# Patient Record
Sex: MALE | Race: WHITE | NOT HISPANIC OR LATINO | Employment: PART TIME
[De-identification: names, ages, dates, MRNs, and addresses within clinical notes are randomized per-mention and may not be internally consistent; named-entity substitution may affect disease eponyms.]

---
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
## 2021-05-27 ENCOUNTER — RECORDS - HEALTHEAST (OUTPATIENT)
Dept: ADMINISTRATIVE | Facility: CLINIC | Age: 21
End: 2021-05-27

## 2021-05-29 ENCOUNTER — RECORDS - HEALTHEAST (OUTPATIENT)
Dept: ADMINISTRATIVE | Facility: CLINIC | Age: 21
End: 2021-05-29

## 2021-05-31 VITALS — WEIGHT: 185.4 LBS

## 2021-06-01 VITALS — HEIGHT: 75 IN | BODY MASS INDEX: 23.54 KG/M2 | WEIGHT: 189.3 LBS

## 2021-06-01 VITALS — HEIGHT: 76 IN | WEIGHT: 189.1 LBS | BODY MASS INDEX: 23.03 KG/M2

## 2021-06-15 NOTE — PROGRESS NOTES
ASSESSMENT:  1. Wheezing  This is Jack's first episode of wheezing.  Two-view chest radiographs were obtained, which showed no acute infiltrate, effusion, pneumothorax, my reading.  Albuterol neb is given with significant improvement in his expiratory wheezes.  I suggested starting albuterol 2 puffs every 4 hours while he is awake until his cough is gone, and then wean off.  We discussed mycoplasma and walking pneumonia.  Return to clinic if his cough is not significantly improved in the next 24-48 hours, sooner with new or worsening symptoms.    - XR Chest 2 Views; Future  - albuterol nebulizer solution 3 mL (PROVENTIL); Take 3 mL by nebulization once.  - azithromycin (ZITHROMAX Z-SONU) 250 MG tablet; Take 500 mg (2 x 250 mg tablets) on day 1 followed by 250 mg (1 tablet) on days 2-5.  Dispense: 6 tablet; Refill: 0  - albuterol (VENTOLIN HFA) 90 mcg/actuation inhaler; Inhale 2 puffs every 4 (four) hours as needed for wheezing (or coughing).  Dispense: 16 g; Refill: 1  - Influenza, Seasonal,Quad Inj, 36+ MOS    2.  Furuncle/cellulitis  I suggested soaking once or twice daily in warm soapy water and recommended starting antibiotics, as above.  Return to clinic in several days, if there is no dramatic improvement; we discussed indications for incision and drainage should an abscess develop.    3. Acute suppur left otitis media w/spontan rupture of tympanic membrane  Reassurance is given regarding his very likely healing otitis media.  Prescription was given for azithromycin, as above.    PLAN:  Patient Instructions   Start Z-pack as prescribed, take 2 pills together today and one pill daily for the next 4 days. This will last in your body for 10 days.    Start albuterol inhaler every 4 hours while awake. Take two puffs, one minute apart. Continue this every 4 hours hours until the cough is gone, then wean off the medication (decreased to three times daily, twice daily, and then stop).       Orders Placed This Encounter    Procedures     XR Chest 2 Views     Standing Status:   Future     Number of Occurrences:   1     Standing Expiration Date:   1/17/2019     Order Specific Question:   Can the procedure be changed per Radiologist protocol?     Answer:   Yes     Influenza, Seasonal,Quad Inj, 36+ MOS     Order Specific Question:   Counseling provided to include answering patients questions and/or preemptively discussing the risks and benefits of all components.     Answer:   Yes     There are no discontinued medications.  Administrations This Visit     albuterol nebulizer solution 3 mL (PROVENTIL)     Admin Date Action Dose Route Administered By             01/17/2018 Given 3 mL Nebulization Adriana Fay CMA                        No Follow-up on file.    CHIEF COMPLAINT:  Chief Complaint   Patient presents with     Cough     started before christmas      Nasal Congestion     few days after cough      Ear Pain     started sunday and was leaking less pain now      Mass     back of right leg noticed on monday morning red and irritated and hard        HISTORY OF PRESENT ILLNESS:  Asif is a 17 y.o. male presenting to the clinic today with mom with concerns for cough. Symptoms started about a month ago with a tickle in his throat that is persistent and causing him to cough. His cough is worse in the day and does not keep him up at night. The cough is occasionally productive but he denies gagging or emesis. He developed nasal congestion a couple days after the cough. He started complaining of ear pain 4 days ago in the middle of the night and went back to sleep. When he woke up, his ear pain was improved but he noted some clear drainage on his pillowcase, no blood. He is a  and struggles to catch his breath with worse coughing after a game or practice. He has not history of asthma or albuterol use.    Mass: He has a mass on the back of his leg that he noticed 2 days ago. He describes the spot as irritated and hard. He tried  to pop the lesion without any production.    Health Maintenance: He will receive his seasonal influenza vaccine today.     REVIEW OF SYSTEMS:   He denies headaches, fevers, facial pressure, or sore throat. All other systems are negative.    PFSH:  He has no known exposure to pertussis. He has not yet received his seasonal influenza vaccine. His dad was recently diagnosed with viral bronchitis and prescribed codeine cough syrup.    Mother reports she had a lesion on her leg evaluated by dermatology, after my suggestion to that effect at Asif's visit last summer.  She reports the lesion was malignant melanoma.  She is currently in remission.    TOBACCO USE:  History   Smoking Status     Never Smoker   Smokeless Tobacco     Never Used       VITALS:  Vitals:    01/17/18 1350   BP: 130/64   Patient Site: Left Arm   Patient Position: Sitting   Cuff Size: Adult Regular   Pulse: 74   Temp: 98.3  F (36.8  C)   TempSrc: Oral   SpO2: 98%   Weight: 185 lb 6.4 oz (84.1 kg)     Wt Readings from Last 3 Encounters:   01/17/18 185 lb 6.4 oz (84.1 kg) (93 %, Z= 1.48)*   08/22/16 149 lb 11.2 oz (67.9 kg) (81 %, Z= 0.86)*   08/14/13 97 lb 8 oz (44.2 kg) (61 %, Z= 0.29)*     * Growth percentiles are based on CDC 2-20 Years data.     There is no height or weight on file to calculate BMI.    PHYSICAL EXAM:  Alert, no acute distress.   HEENT, Conjunctivae are clear. Right TM obscured by cerumen. Left TM with a small amount of purulent material in EAC, TM is mildly erythematous without visible pus or fluid. Nose is clear. Oropharynx is mildly erythematous posteriorly, tonsils 2+ bilaterally, without tonsillar exudate or lesions; small amount of post-nasal drainage is visualized.  Neck is supple without adenopathy. Trachea is midline.  Lungs, Scattered expiratory wheezes in the left base, less so in the left upper fields. Lungs have good air entry bilaterally. Expiratory phase seemed mildly prolonged.  Post Albuterol Neb Exam: One, faint,  end expiratory wheeze after the roby in the left mid-lung field.  Cardiac exam regular rate and rhythm, normal S1 and S2.  Abdomen is soft and nontender, bowel sounds are present, no hepatosplenomegaly.  Skin, clear without rash. Right, posterior lower extremity, overly his calf, there was a 1 cm diameter furuncle with approximately 1 cm diameter induration without tenderness or fluctuance There was a very small, central crust.   Neuro, moving all extremities equally.    ADDITIONAL HISTORY SUMMARIZED (2): None.  DECISION TO OBTAIN EXTRA INFORMATION (1): None.   RADIOLOGY TESTS (1): Chest XR ordered today.  LABS (1): None.  MEDICINE TESTS (1): Albuterol neb given in clinic.  INDEPENDENT REVIEW (2 each): Chest XR interpreted as above.     The visit lasted a total of 27 minutes face to face with the patient. Over 50% of the time was spent counseling and educating the patient about cough.    I, Shellie Busby, am scribing for and in the presence of, Dr. Brown.    I, Radames Bronw, personally performed the services described in this documentation, as scribed by Shellie Busby in my presence, and it is both accurate and complete.    MEDICATIONS:  Current Outpatient Prescriptions   Medication Sig Dispense Refill     butalbital-acetaminophen-caffeine (FIORICET, ESGIC) -40 mg per tablet TAKE 1-2 TABLETS BY MOUTH EVERY 6 HOURS AS NEEDED, MAX 4 TABLETS PER DAY 30 tablet 0     albuterol (VENTOLIN HFA) 90 mcg/actuation inhaler Inhale 2 puffs every 4 (four) hours as needed for wheezing (or coughing). 16 g 1     azithromycin (ZITHROMAX Z-SONU) 250 MG tablet Take 500 mg (2 x 250 mg tablets) on day 1 followed by 250 mg (1 tablet) on days 2-5. 6 tablet 0     No current facility-administered medications for this visit.        Total data points: 4

## 2021-06-19 NOTE — PROGRESS NOTES
Preoperative Exam    Scheduled Procedure: Knee Scope PLM vs  LMR  Surgery Date:  07/31/18  Surgery Location: [facility] [place]  fax [phone]  Surgeon:  Dr. Lange    Assessment/Plan:     1. Right knee meniscal tear    2. Pre-op evaluation        Surgical Procedure Risk: Low (reported cardiac risk generally < 1%)  Have you had prior anesthesia?: No  Have you or any family members had a previous anesthesia reaction: No  Do you or any family members have a history of a clotting or bleeding disorder?:  No    Patient approved for surgery with general or local anesthesia.    Functional Status: Age Appropriate [place]  Patient plans to recover at home with family.  Do you have any concerns regarding care after surgery?: No     Subjective:      Asif Wade is a 18 y.o. male who presents for a preoperative consultation.  He noted R knee pain within the past 2 weeks worsening.  MRI demonstrates R knee meniscal tear.  He cannot recollect an acute injury. He has otherwise been healthy without illness.    All other systems reviewed and are negative, other than those listed in the HPI.    Pertinent History  Any croup, wheezing or respiratory illness in the past 3 weeks?:  No  History of obstructive sleep apnea: No  Steroid use in the last 6 months: No  Any ibuprofen, NSAID or aspirin use in the last 2 weeks?: Yes: aleve; last intake around 7/18/18  Prior Blood Transfusion: No  Prior Blood Transfusion Reaction: No  If for some reason prior to, during or after the procedure, if it is medically indicated, would you be willing to have a blood transfusion?:  There is no transfusion refusal.  Any exposure in the past 3 weeks to chicken pox, Fifth disease, whooping cough, measles, tuberculosis?: No    Current Outpatient Prescriptions   Medication Sig Dispense Refill     MYORISAN 40 mg capsule        butalbital-acetaminophen-caffeine (FIORICET, ESGIC) -40 mg per tablet TAKE 1-2 TABLETS BY MOUTH EVERY 6 HOURS AS  "NEEDED, MAX 4 TABLETS PER DAY 30 tablet 0     No current facility-administered medications for this visit.         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 No pertinent past medical history.    History reviewed. No pertinent surgical history.    Social History     Social History     Marital status: Single     Spouse name: N/A     Number of children: N/A     Years of education: N/A     Occupational History     Not on file.     Social History Main Topics     Smoking status: Never Smoker     Smokeless tobacco: Never Used     Alcohol use Not on file     Drug use: Not on file     Sexual activity: Not on file     Other Topics Concern     Not on file     Social History Narrative    Mom, dad, 2 brothers       Patient Care Team:  Radames Brown MD as PCP - General          Objective:     Vitals:    07/26/18 1016   BP: 100/70   Pulse: 71   Temp: 97.7  F (36.5  C)   TempSrc: Oral   Weight: 189 lb 4.8 oz (85.9 kg)   Height: 6' 3\" (1.905 m)         Physical Exam:  Constitutional: He appears well-developed and well-nourished. He is awake, alert, and cooperative.  HEENT: Head: Normocephalic. Atraumatic.   Right Ear: Normal, pearly tympanic membrane; external ear and canal normal.    Left Ear: Normal, pearly tympanic membrane, external ear and canal normal.    Nose: Nose normal.    Mouth/Throat: Mucous membranes are moist. Oropharynx is clear. Tonsils +1 bilaterally. Normal dentition.   Eyes: Conjunctivae and lids are normal. PERRL, EOMI.  Neck: Supple without lymphadenopathy or tenderness. No thyromegaly or nodules.   Cardiovascular: Normal rate and regular rhythm. No murmur heard. Femoral pulses 2+ bilaterally.  Pulmonary: Clear to auscultation bilaterally. Effort and breath sounds normal. There is normal air entry.  Chest: Normal chest wall. "    Abdominal: Soft, nontender, nondistended. No hepatosplenomegaly. Bowel sounds are normal.   Genitourinary: deferred.   Musculoskeletal: deferred  Psychiatric: He has a normal mood and affect. His speech and behavior are normal.  Skin: Clear. No rashes or lesions noted.    There are no Patient Instructions on file for this visit.    Labs:  Hemoglobin pending.    Immunization History   Administered Date(s) Administered     DTaP, historic 2001, 2001, 2001, 09/10/2002, 08/03/2006     HPV 9 Valent 08/22/2016     HPV Quadrivalent 08/14/2013     Hep A, historic 04/09/2012, 01/18/2013     Hep B, historic 2001, 2001, 09/10/2002     HiB, historic,unspecified 2001, 2001, 09/10/2002     IPV 2001, 2001, 2001, 08/03/2006     Influenza, nasal, historic 01/18/2013     Influenza, seasonal,quad inj 36+ mos 01/17/2018     Influenza,seasonal quad, PF, 36+MOS 08/22/2016     MMR 05/09/2002, 08/03/2006     Meningococcal MCV4P 08/14/2013     Pneumo Conj 7-V(before 2010) 2001, 03/04/2002     Tdap 08/14/2013     Varicella 05/09/2002, 04/09/2012         Electronically signed by Anu Mckeon MD 7/26/2018 10:43 AM

## 2021-06-19 NOTE — PROGRESS NOTES
"[facility] Acute Visit Note:    ASSESSMENT and PLAN:  1. Right knee pain  Ambulatory referral to Orthopedics       Given history and exam, recommended evaluation by orthopedics for more in-depth evaluation and probable imaging. Given lack of stability, is unlikely an ACL or MCL tear, but could be meniscal injury. In the interim, advised avoiding activities that aggravate or cause the pain and concentrate on activities that do not hurt, such as swimming, upper body work, and yoga. Recommended use of ice packs and ibuprofen for pain and inflammation. Referral placed for [facility] and mom given number to make appointment.   Also advised that he is due for a yearly check up and vaccinations/health maintenance screening. Advised that they complete this with Radames Brown MD in the next 1-2 months. Mom acknowledged understanding and agrees with plan.     Return in about 1 month (around 8/19/2018) for 18 year [facility].      CHIEF COMPLAINT:  Chief Complaint   Patient presents with     Knee Pain     -right- knee has a lump x1 week       HISTORY OF PRESENT ILLNESS:  Asif Wade is a 18 y.o. male  presenting to the clinic today for right knee pain and swelling. He is brought into the clinic by his mother.     He states that his right knee started huring about a week ago and then about a day later developed swelling on the outer portion of his right knee. This swelling is painful, and he describes the pain as sharp when he tries to run, jump, or bend the knee. He has not had any known trauma or injury, but is very physically active. He plays hockey, [place], and does a lot of working out-running, squats, and weight lifting.    He has no prior history of knee injuries.     REVIEW OF SYSTEMS:   All other systems are negative.    VITALS:  Vitals:    07/19/18 1504   Weight: 189 lb 1.6 oz (85.8 kg)   Height: 6' 3.75\" (1.924 m)         PHYSICAL EXAM:  General: Alert, well-appearing, " well-hydrated  Respiratory: Clear lungs with normal respiratory effort  CV: Regular rate and rhythm, no murmurs  MSK: Normal gait and stance, normal heel to toe walk. Approximately 3 x 3 cm swelling on lateral aspect of right knee, adjacent to edge of patella-this is moderately tender to palpation. Normal anterior and posterior drawer tests and impingement tests.   Skin: Warm, dry, no rashes    MEDICATIONS:  Current Outpatient Prescriptions   Medication Sig Dispense Refill     MYORISAN 40 mg capsule        butalbital-acetaminophen-caffeine (FIORICET, ESGIC) -40 mg per tablet TAKE 1-2 TABLETS BY MOUTH EVERY 6 HOURS AS NEEDED, MAX 4 TABLETS PER DAY 30 tablet 0     No current facility-administered medications for this visit.        Krystal Gonzalez MD

## 2021-07-22 ENCOUNTER — OFFICE VISIT (OUTPATIENT)
Dept: FAMILY MEDICINE | Facility: CLINIC | Age: 21
End: 2021-07-22
Payer: COMMERCIAL

## 2021-07-22 VITALS
SYSTOLIC BLOOD PRESSURE: 90 MMHG | OXYGEN SATURATION: 97 % | BODY MASS INDEX: 24.72 KG/M2 | DIASTOLIC BLOOD PRESSURE: 60 MMHG | HEART RATE: 66 BPM | HEIGHT: 77 IN | WEIGHT: 209.4 LBS

## 2021-07-22 DIAGNOSIS — Z00.00 ROUTINE MEDICAL EXAM: Primary | ICD-10-CM

## 2021-07-22 DIAGNOSIS — I86.1 LEFT VARICOCELE: ICD-10-CM

## 2021-07-22 PROCEDURE — 99385 PREV VISIT NEW AGE 18-39: CPT | Performed by: FAMILY MEDICINE

## 2021-07-22 ASSESSMENT — MIFFLIN-ST. JEOR: SCORE: 2069.27

## 2021-07-22 ASSESSMENT — ANXIETY QUESTIONNAIRES
GAD7 TOTAL SCORE: 0
7. FEELING AFRAID AS IF SOMETHING AWFUL MIGHT HAPPEN: NOT AT ALL
2. NOT BEING ABLE TO STOP OR CONTROL WORRYING: NOT AT ALL
IF YOU CHECKED OFF ANY PROBLEMS ON THIS QUESTIONNAIRE, HOW DIFFICULT HAVE THESE PROBLEMS MADE IT FOR YOU TO DO YOUR WORK, TAKE CARE OF THINGS AT HOME, OR GET ALONG WITH OTHER PEOPLE: NOT DIFFICULT AT ALL
1. FEELING NERVOUS, ANXIOUS, OR ON EDGE: NOT AT ALL
6. BECOMING EASILY ANNOYED OR IRRITABLE: NOT AT ALL
3. WORRYING TOO MUCH ABOUT DIFFERENT THINGS: NOT AT ALL
5. BEING SO RESTLESS THAT IT IS HARD TO SIT STILL: NOT AT ALL
4. TROUBLE RELAXING: NOT AT ALL

## 2021-07-22 ASSESSMENT — PATIENT HEALTH QUESTIONNAIRE - PHQ9: SUM OF ALL RESPONSES TO PHQ QUESTIONS 1-9: 1

## 2021-07-22 NOTE — PROGRESS NOTES
SUBJECTIVE:   CC: Asif Wade is an 21 year old male who presents for preventative health visit.       Patient has been advised of split billing requirements and indicates understanding: Yes  HPI    Patient here today for a sports physical.  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  He does not have a form with him today but I told him if he comes up with 1 we can certainly fill it out for him.    He is generally pretty healthy he does have a stable left varicocele it does not give him any trouble and is not had a hernia related to it.        Today's PHQ-2 Score: No flowsheet data found.    Abuse: Current or Past(Physical, Sexual or Emotional)- No  Do you feel safe in your environment? Yes    Have you ever done Advance Care Planning? (For example, a Health Directive, POLST, or a discussion with a medical provider or your loved ones about your wishes): No, advance care planning information given to patient to review.  Patient declined advance care planning discussion at this time.    Social History     Tobacco Use     Smoking status: Never Smoker     Smokeless tobacco: Never Used   Substance Use Topics     Alcohol use: Not on file     If you drink alcohol do you typically have >3 drinks per day or >7 drinks per week? No    No flowsheet data found.    Last PSA: No results found for: PSA    Reviewed orders with patient. Reviewed health maintenance and updated orders accordingly - Yes  BP Readings from Last 3 Encounters:   07/22/21 90/60    Wt Readings from Last 3 Encounters:   07/22/21 95 kg (209 lb 6.4 oz)   07/26/18 85.9 kg (189 lb 4.8 oz) (93 %, Z= 1.46)*   07/19/18 85.8 kg (189 lb 1.6 oz) (93 %, Z= 1.46)*     * Growth percentiles are based on CDC (Boys, 2-20 Years) data.                  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 No pertinent surgical history.    Social History  "    Tobacco Use     Smoking status: Never Smoker     Smokeless tobacco: Never Used   Substance Use Topics     Alcohol use: Not Currently     Family History   Problem Relation Age of Onset     Melanoma Mother         2017     No Known Problems Father          No current outpatient medications on file.     No Known Allergies    Reviewed and updated as needed this visit by clinical staff  Tobacco  Allergies  Meds              Reviewed and updated as needed this visit by Provider                Past Medical History:   Diagnosis Date     Clavicle fracture      Migraines       History reviewed. No pertinent surgical history.    Review of Systems  CONSTITUTIONAL: NEGATIVE for fever, chills, change in weight  INTEGUMENTARY/SKIN: NEGATIVE for worrisome rashes, moles or lesions  EYES: NEGATIVE for vision changes or irritation  ENT: NEGATIVE for ear, mouth and throat problems  RESP: NEGATIVE for significant cough or SOB  CV: NEGATIVE for chest pain, palpitations or peripheral edema  GI: NEGATIVE for nausea, abdominal pain, heartburn, or change in bowel habits   male: negative for dysuria, hematuria, decreased urinary stream, erectile dysfunction, urethral discharge  MUSCULOSKELETAL: NEGATIVE for significant arthralgias or myalgia  NEURO: NEGATIVE for weakness, dizziness or paresthesias  PSYCHIATRIC: NEGATIVE for changes in mood or affect    OBJECTIVE:   BP 90/60 (BP Location: Left arm, Patient Position: Sitting, Cuff Size: Adult Regular)   Pulse 66   Ht 1.943 m (6' 4.5\")   Wt 95 kg (209 lb 6.4 oz)   SpO2 97%   BMI 25.16 kg/m      Physical Exam  GENERAL: healthy, alert and no distress  EYES: Eyes grossly normal to inspection, PERRL and conjunctivae and sclerae normal  HENT: ear canals and TM's normal, nose and mouth without ulcers or lesions  NECK: no adenopathy, no asymmetry, masses, or scars and thyroid normal to palpation  RESP: lungs clear to auscultation - no rales, rhonchi or wheezes  CV: regular rate and " "rhythm, normal S1 S2, no S3 or S4, no murmur, click or rub, no peripheral edema and peripheral pulses strong  ABDOMEN: soft, nontender, no hepatosplenomegaly, no masses and bowel sounds normal   (male): normal male genitalia without lesions or urethral discharge, no hernia  MS: no gross musculoskeletal defects noted, no edema  SKIN: no suspicious lesions or rashes  NEURO: Normal strength and tone, mentation intact and speech normal  PSYCH: mentation appears normal, affect normal/bright    Diagnostic Test Results:  none     ASSESSMENT/PLAN:   1. Routine medical exam    Generally the patient is doing very well.  We discussed healthy lifestyles, including adequate exercise (3-4 times a week for 20-30 minutes), and a healthy diet.  Patient should return for annual physicals, and we can also see them here as needed.       2. Left varicocele    This is quite stable and should not present any problem for his participation      Patient has been advised of split billing requirements and indicates understanding: Yes  COUNSELING:   Reviewed preventive health counseling, as reflected in patient instructions       Regular exercise       Healthy diet/nutrition       Alcohol Use        Safe sex practices/STD prevention    Estimated body mass index is 25.16 kg/m  as calculated from the following:    Height as of this encounter: 1.943 m (6' 4.5\").    Weight as of this encounter: 95 kg (209 lb 6.4 oz).         He reports that he has never smoked. He has never used smokeless tobacco.      Counseling Resources:  ATP IV Guidelines  Pooled Cohorts Equation Calculator  FRAX Risk Assessment  ICSI Preventive Guidelines  Dietary Guidelines for Americans, 2010  USDA's MyPlate  ASA Prophylaxis  Lung CA Screening    Carlos Dennison MD  [facility]  "

## 2022-01-29 ENCOUNTER — TRANSFERRED RECORDS (OUTPATIENT)
Dept: HEALTH INFORMATION MANAGEMENT | Facility: CLINIC | Age: 22
End: 2022-01-29
Payer: COMMERCIAL

## 2022-02-04 ENCOUNTER — TRANSFERRED RECORDS (OUTPATIENT)
Dept: HEALTH INFORMATION MANAGEMENT | Facility: CLINIC | Age: 22
End: 2022-02-04
Payer: COMMERCIAL

## 2022-06-08 ENCOUNTER — TRANSFERRED RECORDS (OUTPATIENT)
Dept: HEALTH INFORMATION MANAGEMENT | Facility: CLINIC | Age: 22
End: 2022-06-08
Payer: COMMERCIAL